# Patient Record
Sex: MALE | Race: WHITE | URBAN - METROPOLITAN AREA
[De-identification: names, ages, dates, MRNs, and addresses within clinical notes are randomized per-mention and may not be internally consistent; named-entity substitution may affect disease eponyms.]

---

## 2020-12-29 ENCOUNTER — OFFICE VISIT (OUTPATIENT)
Dept: ORTHOPEDIC SURGERY | Age: 70
End: 2020-12-29
Payer: MEDICARE

## 2020-12-29 VITALS
TEMPERATURE: 97.2 F | DIASTOLIC BLOOD PRESSURE: 70 MMHG | HEIGHT: 77 IN | HEART RATE: 63 BPM | SYSTOLIC BLOOD PRESSURE: 97 MMHG | BODY MASS INDEX: 31.88 KG/M2 | WEIGHT: 270 LBS

## 2020-12-29 DIAGNOSIS — M25.551 RIGHT HIP PAIN: Primary | ICD-10-CM

## 2020-12-29 PROCEDURE — 99213 OFFICE O/P EST LOW 20 MIN: CPT | Performed by: ORTHOPAEDIC SURGERY

## 2020-12-29 RX ORDER — SOTALOL HYDROCHLORIDE 80 MG/1
80 TABLET ORAL EVERY 12 HOURS
COMMUNITY
Start: 2020-12-28

## 2020-12-29 RX ORDER — RIVAROXABAN 20 MG/1
20 TABLET, FILM COATED ORAL DAILY
COMMUNITY
Start: 2020-12-08

## 2020-12-29 RX ORDER — CARVEDILOL 25 MG/1
25 TABLET ORAL 2 TIMES DAILY WITH MEALS
COMMUNITY

## 2020-12-29 RX ORDER — IVABRADINE 5 MG/1
5 TABLET, FILM COATED ORAL 2 TIMES DAILY
COMMUNITY
Start: 2020-02-24

## 2020-12-29 RX ORDER — ATORVASTATIN CALCIUM 40 MG/1
40 TABLET, FILM COATED ORAL DAILY
COMMUNITY
Start: 2020-11-09

## 2020-12-29 RX ORDER — LOSARTAN POTASSIUM 100 MG/1
100 TABLET ORAL DAILY
COMMUNITY
Start: 2020-11-09

## 2020-12-29 RX ORDER — SPIRONOLACTONE 25 MG/1
25 TABLET ORAL DAILY
COMMUNITY
Start: 2020-11-16

## 2021-01-02 NOTE — PROGRESS NOTES
Patient Name: Raul Oakley MRN: <[id number]>   Age: 78 y.o. YOB: 1950   Sex: male         [address] Complaint   Patient presents with    Hip Pain     Right hip-Prev MARIO       HISTORY OF PRESENT ILLNESS   Patient returns for their annual evaluation status post total hip replacement. The patient is doing very well. Now with increase in compalnts of pain in the right lateral hip with radiation down the leg. Moving well overall but had increase in pain over the past sevreal weeks and potentially prior to that. They have mild complaints of pain. There is no swelling about the Hip. The patient has returned to normal activities. They deny any calf swelling or numbness or tingling down the leg       Assessment      Review of Systems   Constitutional: Negative for chills and fever. HENT: Negative for nosebleeds. Eyes: Negative for double vision. Cardiovascular: Negative for chest pain. Gastrointestinal: Negative for abdominal pain. Musculoskeletal: Positive for joint pain and myalgias. Skin: Negative for rash. Neurological: Negative for seizures. Psychiatric/Behavioral: Negative for hallucinations. PHYSICAL EXAM     Vital Signs:   Vitals:    12/29/20 1110   BP: 97/70   Pulse: 63   Temp: 97.2 °F (36.2 °C)       Examination of the hip shows a well-healed incision. There is no major swelling. There is no deformity. Range of motion is without major pain. There is no calf tenderness. Lumbar motins are tight with decrease in overall lumbar lordodis associated with this is hamstring tightness. Pain over the IT band and the lateral trochanter. Hip rom intact. No major compalints of pain in the opposite side. No obvious isues with the sciatic nerve or gluteal wekaness.        The patient is neurovascularly intact    RADIOLOGY     Xray   Have reviewed the xrays above from 01/01/21   and my impression is: X-Rays reviewed: AP and lateral x-rays of the hip show excellent alignment of the total hip prosthesis. There is no loosening or fractures noted. IMPRESSION   Annual evaluation status post total hip replacement. Potentially early issues with the back and hamstring tightness, bursitis. PLAN   The patient is doing well status post total hip replacement. Today we'll continue with normal activities and exercise program.  Visit to return in 2-5 year for repeat evaluation and x-rays of the hip. Have discussed aggressive hamstring tightness work with stretching and yoga/pilates. Back flexibility program   He isvery relievd overall that the hip is doing well. He has no major concerns regarding the pain which appears to be receding. ICD-10-CM    1.  Right hip pain  M25.551 XR HIP 2-3 VW W PELVIS RIGHT